# Patient Record
Sex: MALE | Race: WHITE | NOT HISPANIC OR LATINO | URBAN - METROPOLITAN AREA
[De-identification: names, ages, dates, MRNs, and addresses within clinical notes are randomized per-mention and may not be internally consistent; named-entity substitution may affect disease eponyms.]

---
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
## 2024-04-01 NOTE — PROCEDURE: PRESCRIPTION MEDICATION MANAGEMENT
No
Continue Regimen: Finasteride (refills sent in)
Modify Regimen: Increase minoxidil to 2.5 mg PO BID, if not tolerating sxs, ok to go back down to daily.
Detail Level: Zone
Render In Strict Bullet Format?: No

## 2024-10-11 NOTE — PROCEDURE: MEDICATION COUNSELING
8 (severe pain) Fluconazole Pregnancy And Lactation Text: This medication is Pregnancy Category C and it isn't know if it is safe during pregnancy. It is also excreted in breast milk.